# Patient Record
(demographics unavailable — no encounter records)

---

## 2024-10-11 NOTE — HISTORY OF PRESENT ILLNESS
[de-identified] : 35M who presents with multiple ENT problems  Sinus problems started 9/8/24 -> took augmentin -> developed oral thrust -> tried nystatin and fluconazole which the patient believes did not help  Hx of chronic congestion and seasonal allergies. Does not take any allergy medication or use any nasal irrigations or sprays.   Hx of hearing loss as a child in the right ear. Patient was a preemie. He wears a bicros. He has a history of pressure issues in both ears since he was a child and has difficulty popping his ears.

## 2024-10-11 NOTE — ASSESSMENT
[FreeTextEntry1] : - The total time spent on the date of this encounter was 60+ minutes. This includes time spent in both face-to-face and non face-to-face activities (review of tests/documents/independent historians and/or independent interpretation of tests)

## 2024-10-11 NOTE — REASON FOR VISIT
[Initial Consultation] : an initial consultation for [FreeTextEntry2] : thrust, cerumen, hearing loss, sinus problems

## 2024-10-11 NOTE — PHYSICAL EXAM
[TextEntry] : General: AAO, no significant distress Psychiatric: affect pleasant and within normal limits Eyes: relatively symmetric, no obvious nystagmus Skin: no significant lesions on face Nose: no significant lesions; patent. Oral Cavity & Oropharynx: no significant deformity or lesions Neck/Lymphatics: no significant masses or abnormal cervical nodes palpated Respiratory: breathing comfortably; no significant distress Neurologic: cranial nerves II-XII grossly intact; EOMI Facial function: symmetric   Ear examination performed under binocular otologic microscope: Left Ear External: Pinna and periauricular area is normal. Canal: Ear canal skin is not inflamed or edematous. Left cerumen impaction cleaned under microscopy with instrumentation Tympanic Membrane: Intact and in good position. MYRINGOINCUDOPEXY, PRUSSAK RETRACTION   Right Ear External: Pinna and periauricular area is normal. Canal: Ear canal skin is not inflamed or edematous. Right cerumen impaction cleaned under microscopy with instrumentation Tympanic Membrane: Intact and in good position.  Procedure: Left cerumen removal Pre-operative Diagnosis: Left cerumen impaction Post-operative Diagnosis: Left cerumen impaction Anesthesia: None Procedure Details: The patient was placed in the supine position. The left ear canal was determined to be impacted with cerumen. A curette and/or suction was used to remove the cerumen impaction under microscopy. Condition: Stable. Patient tolerated procedure well. Complications: None.   Procedure: Right cerumen removal Pre-operative Diagnosis: Right cerumen impaction Post-operative Diagnosis: Right cerumen impaction Anesthesia: None Procedure Details: The patient was placed in the supine position. The right ear canal was determined to be impacted with cerumen. A curette and/or suction was used to remove the cerumen impaction under microscopy. Condition: Stable. Patient tolerated procedure well. Complications: None.   Nasal Endoscopy:   Pre-operative Diagnosis: nasal obstruction Post-operative Diagnosis: nasal obstruction Anesthesia: Topical oxymetazoline Procedure: Bilateral nasal endoscopy Procedure Details:   The patient was placed in the seated position sitting straight up. The telescope was passed along the left nasal floor to the nasopharynx. It was then passed into the region of the middle meatus, middle turbinate, and the sphenoethmoid region. An identical procedure was performed on the right side.   Findings: Middle meatus: normal bilaterally Sphenoethmoidal recess: normal bilaterally Mucosa: excoriated anteriorly on septum and inferior turbinates Nasal septum: large septal spur on the right touching the inferior turbinate Discharge: none bilaterally Turbinates: inferior turbinate hypertrophy bilaterally Adenoid: normal bilaterally Posterior choanae: normal bilaterally Eustachian tubes: normal bilaterally Mucous stranding: normal bilaterally Lesions: Not present   Comments:   Condition: Stable. Patient tolerated procedure well.

## 2024-10-18 NOTE — HISTORY OF PRESENT ILLNESS
[de-identified] : 35M who presents with multiple ENT problems  Sinus problems started 9/8/24 -> took augmentin -> developed oral thrust -> tried nystatin and fluconazole which the patient believes did not help  Hx of chronic congestion and seasonal allergies. Does not take any allergy medication or use any nasal irrigations or sprays.   Hx of hearing loss as a child in the right ear. Patient was a preemie. He wears a bicros. He has a history of pressure issues in both ears since he was a child and has difficulty popping his ears.   10/18/24: f/u for audiogram. No hearing on the right since childhood. Hears a popping sound in his left ear. When he valsalvas, the hearing improves slightly but then goes back to baseline soon after. Still with sinus issues depiste using NSI and flonase.

## 2024-10-18 NOTE — DATA REVIEWED
[de-identified] : Audiogram personally reviewed and interpreted and my findings are as follows (10/18/24): Right: SRT 85dB; WRS CNT; Type Ad tymp; profound SNHL Left: SRT 40dB; WRS 90%; Type A tymp; normal downsloping to severe SNHL

## 2024-10-18 NOTE — PHYSICAL EXAM
[TextEntry] : General: AAO, no significant distress Psychiatric: affect pleasant and within normal limits Eyes: relatively symmetric, no obvious nystagmus Skin: no significant lesions on face Nose: no significant lesions; patent. Oral Cavity & Oropharynx: no significant deformity or lesions Neck/Lymphatics: no significant masses or abnormal cervical nodes palpated Respiratory: breathing comfortably; no significant distress Neurologic: cranial nerves II-XII grossly intact; EOMI Facial function: symmetric   Ear examination performed under binocular otologic microscope: Left Ear External: Pinna and periauricular area is normal. Canal: Ear canal skin is not inflamed or edematous.  Tympanic Membrane: Intact and in good position. MYRINGOINCUDOPEXY, PRUSSAK RETRACTION   Right Ear External: Pinna and periauricular area is normal. Canal: Ear canal skin is not inflamed or edematous.  Tympanic Membrane: Intact and in good position.

## 2024-10-30 NOTE — HISTORY OF PRESENT ILLNESS
[de-identified] : 35M here for initial evaluation.  He c/o nasal congestion/obstruction with postnasal drip and white mucus down his throat with occasional facial pressure. This persists despite saline and steroid sprays. The congestion affects both sides and can alternate in severity. There are no allergies or hayfever sx. No green mucus or foul nasal odor. He has known GERD and he thinks this also worsens his postnasal drip. Diet could be better.  ROS otherwise unremarkable.

## 2024-10-30 NOTE — ASSESSMENT
[FreeTextEntry1] : 35M here for initial evaluation. He c/o nasal congestion/obstruction with postnasal drip and white mucus down his throat with occasional facial pressure. This persists despite saline and steroid sprays. The congestion affects both sides and can alternate in severity. There are no allergies or hayfever sx. No green mucus or foul nasal odor. He has known GERD and he thinks this also worsens his postnasal drip. His diet could be better for reflux control. On exam, nasal endoscopy shows moderate inferior turbinate hypertrophy and right septal deviation and spurring with scattered mucus. Chronic rhinitis/turbinate hypertrophy and septal deviation, doubtful there is sinusitis. LPR also worsening his postnasal drip. Will get CT sinus for completeness and RTO thereafter. Can offer septoplasty/turbinate reduction if wants additional improvement, pending CT.
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
## 2024-11-27 NOTE — PROCEDURE
[FreeTextEntry3] : +inspiratory collapse marked improvement in breathing w modified agus/agus  Nasal Endoscopy: moderate inferior turbinate hypertrophy -> decongest well severe right septal deviation and spurring middle meati clear

## 2024-11-27 NOTE — ASSESSMENT
[FreeTextEntry1] : 35M here in followup. He c/o nasal congestion/obstruction with postnasal drip and white mucus down his throat with occasional facial pressure. This persists despite saline and steroid sprays. The congestion affects both sides and can alternate in severity. There are no allergies or hayfever sx. No green mucus or foul nasal odor. He has known GERD and he thinks this also worsens his postnasal drip. His diet could be better for reflux control. CT sinus shows complex septal deviation and turbinate hypertrophy, as above. On exam, nasal endoscopy shows moderate inferior turbinate hypertrophy and right septal deviation and spurring; nasal breathing is improved w decongestion but markedly improved with modified agus. There is also inspiratory collapse. Nasal obstruction is multifactorial - I think primary issue is valve collapse. There is also a chronic rhinitis/turbinate hypertrophy and septal deviation, but they are not the main reason to the obstruction. Rhinitis and LPR worsening his postnasal drip. Will send to my facial plastics colleague for further evaluation and management given the valve collapse.

## 2024-11-27 NOTE — HISTORY OF PRESENT ILLNESS
[de-identified] : 35M here in followup.  He c/o nasal congestion/obstruction with postnasal drip and white mucus down his throat with occasional facial pressure. This persists despite saline and steroid sprays. The congestion affects both sides and can alternate in severity. There are no allergies or hayfever sx. No green mucus or foul nasal odor. He has known GERD and he thinks this also worsens his postnasal drip. Diet could be better.  CT Sinus 11/14/24 (I reviewed): -complex right septal spurring and deviation -inferior turbinate hypertrophy -paranasal sinuses clear  ROS otherwise unremarkable.

## 2024-12-11 NOTE — HISTORY OF PRESENT ILLNESS
[de-identified] : Mr Andrew Saul is a pleasant 36 year old male who presents as referral from Dr Lund for nasal obstruction.   Pt reports h/o long-standing nasal obstruction, R>L. Also endorses PND and constant congestion. Symptoms have been present for their whole life. They have tried nasal steroid sprays in the past for 4 consecutive weeks without significant improvement. They have tried nasal saline irrigations. They have not tried nasal dilator strips/nasal cones. They endorse h/o allergies, well-controlled. Endorses frequent h/o sinus infections recently. Denies h/o nasal trauma. Denies h/o nasal surgery. Recent CT imaging revealing septal deviation and clear paranasal sinuses. Pt was evaluated by Dr Lund and nasal endoscopy was completed at that time revealing moderate turbinate hypertrophy and severe septal deviaton. Aesthetically, he has no concerns.   Past medical/surgical history is unremarkable. Nonsmoker. Not on anticoagulation. Here by himself.

## 2024-12-11 NOTE — PHYSICAL EXAM
[TextEntry] : GEN: well nourished, well developed, no acute distress. VOICE: normal tone, quality, and volume, no hoarseness. HEAD: normocephalic, atraumatic, no TMJ pain or jaw clicking FACE: symmetric and motion intact, Juan 1  EYES: EOMI, pupils symmetric, normal conjunctiva, vision grossly intact EARS: bilateral auricles normal, TMs intact & well-aerated, hearing grossly intact EXT NOSE:  thin skin, midline dorsum, overprojected nasal septum, very poor tip support  INT NOSE: Mucosa healthy, severe rightward septum deviation, very narrow internal nasal valve w/ significant improvement on [place] maneuver in  position, bialteral internal valve dynamic collapse, moderate turbinate hypertrophy SKIN: intact, warm, and dry NEURO: alert & oriented x4
No

## 2024-12-11 NOTE — ASSESSMENT
[FreeTextEntry1] : .  Plan: - Patient has significant nasal obstruction refractory to maximal medical therapy and structural anatomic abnormalities that would benefit from nasal airway surgery. This would comprise septoplasty via open approach, nasal valve repair (bilateral  vs LCSG), and bilateral turbinate reduction. - Discussed in detail the benefits, alternatives, and risks of this procedure which include, but are not limited to, infection, bleeding, scarring, change in appearance, septal perforation, continued nasal obstruction, and need for revision surgery. - The patient reports understanding of these risks, the proposed benefits, and alternatives and wishes to proceed.  We will initiate the authorization/scheduling process.  -- Yasmeen Negron MD Facial Plastic & Reconstructive Surgery

## 2025-01-31 NOTE — PHYSICAL EXAM
[No Acute Distress] : no acute distress [Well Nourished] : well nourished [Well Developed] : well developed [Well-Appearing] : well-appearing [Normal Sclera/Conjunctiva] : normal sclera/conjunctiva [PERRL] : pupils equal round and reactive to light [EOMI] : extraocular movements intact [Normal Outer Ear/Nose] : the outer ears and nose were normal in appearance [Normal Oropharynx] : the oropharynx was normal [Thyroid Normal, No Nodules] : the thyroid was normal and there were no nodules present [No Respiratory Distress] : no respiratory distress  [No Accessory Muscle Use] : no accessory muscle use [Clear to Auscultation] : lungs were clear to auscultation bilaterally [Normal Rate] : normal rate  [Regular Rhythm] : with a regular rhythm [Normal S1, S2] : normal S1 and S2 [No Murmur] : no murmur heard [No Edema] : there was no peripheral edema [No CVA Tenderness] : no CVA  tenderness [No Spinal Tenderness] : no spinal tenderness [No Joint Swelling] : no joint swelling [Grossly Normal Strength/Tone] : grossly normal strength/tone [No Rash] : no rash [No Focal Deficits] : no focal deficits [Normal Affect] : the affect was normal [Normal Insight/Judgement] : insight and judgment were intact

## 2025-02-04 NOTE — HISTORY OF PRESENT ILLNESS
[No Adverse Anesthesia Reaction] : no adverse anesthesia reaction in self or family member [No Pertinent Cardiac History] : no history of aortic stenosis, atrial fibrillation, coronary artery disease, recent myocardial infarction, or implantable device/pacemaker [(Patient denies any chest pain, claudication, dyspnea on exertion, orthopnea, palpitations or syncope)] : Patient denies any chest pain, claudication, dyspnea on exertion, orthopnea, palpitations or syncope [Excellent (>10 METs)] : Excellent (>10 METs) [Aortic Stenosis] : no aortic stenosis [Atrial Fibrillation] : no atrial fibrillation [Coronary Artery Disease] : no coronary artery disease [Recent Myocardial Infarction] : no recent myocardial infarction [Implantable Device/Pacemaker] : no implantable device/pacemaker [Asthma] : no asthma [COPD] : no COPD [Sleep Apnea] : no sleep apnea [Smoker] : not a smoker [Family Member] : no family member with adverse anesthesia reaction/sudden death [Self] : no previous adverse anesthesia reaction [Chronic Anticoagulation] : no chronic anticoagulation [Chronic Kidney Disease] : no chronic kidney disease [Diabetes] : no diabetes [FreeTextEntry1] : septoplasty, nasal valve repair, bilateral turbinate reduction [FreeTextEntry2] : 2/12/2025 [FreeTextEntry3] : Dr. Yasmeen Negron  [FreeTextEntry4] : 36 M presents for pre-op visit. Will be having surgery 2/21. Pt feels well, also here for f/u PREP and STD testing.

## 2025-02-26 NOTE — REASON FOR VISIT
[de-identified] : open nasal airway surgery [de-identified] : 2/21/25 [de-identified] : Pt is doing well since surgery. Pain is well-controlled at this point on just Tylenol/Advil. He has been doing the mupirocin ointment and nasal saline sprays as prescribed.   Exam: Dorsum moderate swelling as expected Transcolumellar incision well-healing Septum intact & midline, nasal airway widely patent Nasal tip well-supported   . Plan: - Mupirocin & nasal saline sprays as prescribed. - Nasal precautions reviewed. - RTC 1 month   -- Yasmeen Negron MD Facial Plastic & Reconstructive Surgery

## 2025-03-19 NOTE — PLAN
[TextEntry] : - Trial of 5 days of Keflex and if not improved consider I&D. - Warm compresses - RTC in 1 week

## 2025-03-19 NOTE — REASON FOR VISIT
[de-identified] : MICHAEL NUGENT is status post open nasal airway surgery and he is here for a post-op visit.   Surgery Date: 2/21/25   Pt is doing well since surgery. Pain is well-controlled at this point. He still has some numbness and swelling of the nose as expected. He has been doing the mupirocin ointment and nasal saline sprays as prescribed.  Exam: Dorsum moderate swelling as expected Transcolumellar incision well-healing Septum intact & midline, nasal airway widely patent Nasal tip well-supported  . Plan: - Mupirocin & nasal saline sprays as prescribed. - Nasal precautions reviewed. - RTC 2 months  -- Yasmeen Negron MD Facial Plastic & Reconstructive Surgery.

## 2025-03-19 NOTE — HISTORY OF PRESENT ILLNESS
[FreeTextEntry1] : 36M presents concerned for pilonidal cyst. He underwent a rhinoplasty 2 weeks ago and has been laying in bed on his back for prolonged periods of time. Over the past week he developed a painful bump on tailbone. Denies drainage to area. No history of abscesses or issues with ingrown hairs. Having daily soft BMs, no recent constipation or straining.   Referred by: Samantha Referral Reason: "cyst on buttock"  PMH: hard of hearing PSH: rhinoplasty FH: denies CRC/ IBD Medications: descovy Allergies: sulfa drugs Social history: social ETOH Last Colonoscopy: denies

## 2025-03-19 NOTE — ASSESSMENT
[FreeTextEntry1] : 36M with cellulitis of the right medial buttock, less likely pilonidal in origine due to lack of midline pit, however may still be cystic in nature.

## 2025-03-19 NOTE — PHYSICAL EXAM
[Abdomen Tenderness] : ~T No ~M abdominal tenderness [JVD] : no jugular venous distention  [Normal Breath Sounds] : Normal breath sounds [No Rash or Lesion] : No rash or lesion [Alert] : alert [Calm] : calm [de-identified] : Well appearing male in NAD [de-identified] : MMM [de-identified] : ROM WNL [FreeTextEntry1] : The pt was examined in the prone axel-knife position with a medical assistant present for the entirety of the examination. Visual examination of the anal verge with effacement of the buttocks revealed no masses, ulcerations, fissures or skin rashes. At the superior medial right intergluteal cleft there was a 2x2cm area of erythema off midline with mild induration and questionable fluctuance. No midline pit appreciated. Digital rectal exam revealed no palpable mass or blood with sphincter tone within normal limits. A lubricated anoscope was then inserted revealing healthy appearing distal rectal mucosa and anoderm with three appropriately sized, noninflamed internal hemorrhoids.  The patient tolerated the exam well.

## 2025-04-09 NOTE — PHYSICAL EXAM
[Abdomen Tenderness] : ~T No ~M abdominal tenderness [JVD] : no jugular venous distention  [Normal Breath Sounds] : Normal breath sounds [No Rash or Lesion] : No rash or lesion [Alert] : alert [Calm] : calm [de-identified] : Well appearing male in NAD [de-identified] : MMM [de-identified] : ROM WNL [FreeTextEntry1] : The pt was examined in the prone axel-knife position with a medical assistant present for the entirety of the examination. Visual examination of the anal verge with effacement of the buttocks revealed no masses, ulcerations, fissures or skin rashes. At the superior medial right intergluteal cleft there was a 1x1cm area of mild induration, no fluctuance and no skin changes. No midline pit appreciated.   The patient tolerated the exam well.

## 2025-04-09 NOTE — HISTORY OF PRESENT ILLNESS
[FreeTextEntry1] : 36M with cellulitis of the right medial buttock, less likely pilonidal in origin due to lack of midline pit, however may still be cystic in nature. He was seen 3 weeks prior and recommended a trial of Keflex. Today he presents for follow up. Finished his course of antibiotics. He reports symptom improvement but still present. Denies any drainage or pain. Bowel movement has been normal.   Referred by: Samantha Referral Reason: "cyst on buttock"  PMH: hard of hearing PSH: rhinoplasty FH: denies CRC/ IBD Medications: descovy Allergies: sulfa drugs Social history: social ETOH Last Colonoscopy: denies

## 2025-04-09 NOTE — ASSESSMENT
[FreeTextEntry1] : 36M with cellulitis of the right medial buttock, less likely pilonidal in origin due to lack of midline pit, however may still be cystic in nature. Overall improved with limited abx course.

## 2025-04-09 NOTE — PLAN
[TextEntry] : - Continue to observe for now and RTC in 1 month for interval exam. - If palpable induration or cyst remains can consider MRI at that point

## 2025-05-09 NOTE — HISTORY OF PRESENT ILLNESS
[FreeTextEntry1] : followup  [de-identified] : 36 M presents for followup hyperhidrosis. Pt was seen by derm, tried glycopyrrolate but caused dry mouth. He would like to see endo to rule out hormonal cause of symptoms. Did have normal TSH last year.

## 2025-05-09 NOTE — ASSESSMENT
[FreeTextEntry1] : #Hyperhidrosis -saw derm, was prescribed glycopyrrolate which he had side effects to -discussed potentially trying botox with derm  -pt interested in seeing endo to r/o other cause of increased sweating and body warmth, TSH WNL last year -endo referral provided   #on HIV prep -continue descovy -f/u STD testing

## 2025-05-09 NOTE — HISTORY OF PRESENT ILLNESS
[FreeTextEntry1] : followup  [de-identified] : 36 M presents for followup hyperhidrosis. Pt was seen by derm, tried glycopyrrolate but caused dry mouth. He would like to see endo to rule out hormonal cause of symptoms. Did have normal TSH last year.

## 2025-05-14 NOTE — PHYSICAL EXAM
[Abdomen Tenderness] : ~T No ~M abdominal tenderness [JVD] : no jugular venous distention  [Normal Breath Sounds] : Normal breath sounds [No Rash or Lesion] : No rash or lesion [Alert] : alert [Calm] : calm [de-identified] : Well appearing male in NAD [de-identified] : MMM [de-identified] : ROM WNL [FreeTextEntry1] : The pt was examined in the prone axel-knife position with a medical assistant present for the entirety of the examination. Visual examination of the anal verge with effacement of the buttocks revealed no masses, ulcerations, fissures or skin rashes. At the inferior medial right intergluteal cleft there was a 1x1cm area of a palpable subdermal cyst or nodule, no fluctuance and no skin changes. No midline pit appreciated.   The patient tolerated the exam well.

## 2025-05-14 NOTE — REASON FOR VISIT
[de-identified] : open nasal airway surgery [de-identified] : 2/21/25 [de-identified] : Pt is doing well since surgery. Pain is well-controlled at this point. He notes some asymmetric swelling of the nasal tip. Reports that nasal breathing is much better.    Exam: Dorsum moderate swelling as expected - improving Transcolumellar incision well-healing Septum intact & midline, nasal airway widely patent Nasal tip well-supported   . Plan: - Nasal saline sprays as prescribed. - Nasal precautions reviewed. - RTC PRN   -- Yasmeen Negron MD Facial Plastic & Reconstructive Surgery

## 2025-05-14 NOTE — PLAN
[TextEntry] : - Recommend US to better categorize the lesion prior to discussion of excisional biopsy.

## 2025-05-14 NOTE — HISTORY OF PRESENT ILLNESS
[FreeTextEntry1] : 36M with cellulitis of the right medial buttock, less likely pilonidal in origin due to lack of midline pit, however may still be cystic in nature which improved on a limited course of antibiotic. Advised continue observation. Patient presents today for interval follow up and is without complaints. He has no pain or drainage from area. He reports still feeling the cystic area but denies irritation.   Initially referred by: Samantha  PMH: hard of hearing PSH: rhinoplasty FH: denies CRC/ IBD Medications: descovy Allergies: sulfa drugs Social history: social ETOH Last Colonoscopy: denies

## 2025-05-14 NOTE — PHYSICAL EXAM
[Abdomen Tenderness] : ~T No ~M abdominal tenderness [JVD] : no jugular venous distention  [Normal Breath Sounds] : Normal breath sounds [No Rash or Lesion] : No rash or lesion [Alert] : alert [Calm] : calm [de-identified] : Well appearing male in NAD [de-identified] : MMM [de-identified] : ROM WNL [FreeTextEntry1] : The pt was examined in the prone axel-knife position with a medical assistant present for the entirety of the examination. Visual examination of the anal verge with effacement of the buttocks revealed no masses, ulcerations, fissures or skin rashes. At the inferior medial right intergluteal cleft there was a 1x1cm area of a palpable subdermal cyst or nodule, no fluctuance and no skin changes. No midline pit appreciated.   The patient tolerated the exam well.

## 2025-06-07 NOTE — DATA REVIEWED
[de-identified] : Audiogram personally reviewed and interpreted and my findings are as follows (10/18/24): Right: SRT 85dB; WRS CNT; Type Ad tymp; profound SNHL Left: SRT 40dB; WRS 90%; Type A tymp; normal downsloping to severe SNHL

## 2025-06-07 NOTE — ASSESSMENT
[FreeTextEntry1] : Bilateral SNHL - 1 chronic illness - audiogram (10/18/24) reviewed with patient  - R profound SNHL since childhood - L normal down to severe SNHL - f/u in October 2025 for repeat audiogram  Bilateral ETD - 1 chronic illness - L ear wtih prussak retraction and myringoincudopexy - will continue to monitor for possible IS joint degeneration in the future - watch for air-bone gap development in the left ear  Post-nasal drip / Chronic rhinitis - 1 chronic illness - s/p open septoplasty with Dr. Negron 02/2025 with excellent result - ipratropium bromide - 1 spray to each nostril BID - f/u in October 2025  Tonsil stones - recommend water pick to wash tonsils  Tip of nose pain - continue to monitor, likely continued sensitivity from recent open septoplasty

## 2025-06-07 NOTE — HISTORY OF PRESENT ILLNESS
[de-identified] : 35M who presents with multiple ENT problems  Sinus problems started 9/8/24 -> took augmentin -> developed oral thrust -> tried nystatin and fluconazole which the patient believes did not help  Hx of chronic congestion and seasonal allergies. Does not take any allergy medication or use any nasal irrigations or sprays.   Hx of hearing loss as a child in the right ear. Patient was a preemie. He wears a bicros. He has a history of pressure issues in both ears since he was a child and has difficulty popping his ears.   10/18/24: f/u for audiogram. No hearing on the right since childhood. Hears a popping sound in his left ear. When he valsalvas, the hearing improves slightly but then goes back to baseline soon after. Still with sinus issues depiste using NSI and flonase.   6/6/25: Patient is s/p open septoplasty with Dr. Negron. He is breathing much better but has been suffering from post-nasal drip, tonsil stones, and nasal pain.

## 2025-06-25 NOTE — HISTORY OF PRESENT ILLNESS
[de-identified] : 36 year old male initially presenting with nasal obstruction s/p open nasal airway surgery carried out on 2/21/25. He is doing well from breathing standpoint. He presents because he feels he has mucus in the back of the throat. Worse in the mornings, especially after coffee. He also endorses wet cough and foul taste in mouth. He feels he has some PND. He does have h/o tonsil stones that started in the last year that he clears with salt-water gargles. He also does endorse h/o gastritis. He is not on reflux medication. Of note has hearing loss, sees Dr Montana.

## 2025-06-25 NOTE — ASSESSMENT
[FreeTextEntry1] : .  Plan: - Suspect symptoms are related to reflux. Recommended omeprazole trial - Recommended diet changes to limit reflux triggers - RTC 1-2 months for reevaluation  -- Yasmeen Negron MD Facial Plastic & Reconstructive Surgery

## 2025-06-25 NOTE — PROCEDURE
[FreeTextEntry3] : Flexible Laryngoscopy: Pre-operative diagnosis: cough, mucus Indication: Mirror exam insufficient to diagnose pathology Details: After decongestant and lidocaine was sprayed in the bilateral nasal cavities, a flexible endoscope was inserted into the right nares. The nasal cavity, middle meatus, and nasopharynx were visualized. The endoscope was then flexed inferiorly to visualize the oropharynx, hypopharynx, and larynx. The endoscope was then inserted into the left nares and an identical procedure was performed. The patient tolerated the procedure well. Findings: no masses or lesions, vocal cords symmetric and mobile bilaterally, moderate-severe postcricoid edema

## 2025-06-25 NOTE — PHYSICAL EXAM
[TextEntry] : GEN: well nourished, well developed, no acute distress. HEAD: normocephalic, atraumatic FACE: symmetric and motion intact, Juan 1  EXT NOSE:  no external deformity, well-healing transcolumellar incision, tip well-supported INT NOSE: Mucosa healthy, midline septum, widely patent nasal airway  ORAL CAVITY: normal dentition with baseline occlusion, mucus membranes healthy, 2-3+ tonsils without tonsil stones today, no evidence of thrush SKIN: intact, warm, and dry NEURO: alert & oriented x4

## 2025-07-23 NOTE — ASSESSMENT
[FreeTextEntry1] : .  Plan: - Reassurance on appearance of tongue - Continue reflux management - RTC PRN  -- Yasmeen Negron MD Facial Plastic & Reconstructive Surgery

## 2025-07-23 NOTE — HISTORY OF PRESENT ILLNESS
[de-identified] : 36 year old male initially presenting with nasal obstruction s/p open nasal airway surgery carried out on 2/21/25. He is doing well from breathing standpoint. He reports no significant improvement with the. mucus sensation despite PPI. He has been working on improving his diet. He does have occasional tonsil stones. Doing great from nasal breathing standpoint. He is also slightly bothered by the white coating on his tongue.

## 2025-07-23 NOTE — PHYSICAL EXAM
[TextEntry] : GEN: well nourished, well developed, no acute distress. FACE: symmetric and motion intact, Juan 1 EXT NOSE: no external deformity, well-healing transcolumellar incision, tip well-supported INT NOSE: Mucosa healthy, midline septum, widely patent nasal airway ORAL CAVITY: normal dentition with baseline occlusion, mucus membranes healthy, 2-3+ tonsils without tonsil stones today, no evidence of thrush, tongue normal SKIN: intact, warm, and dry NEURO: alert & oriented x4

## 2025-07-23 NOTE — PROCEDURE
[FreeTextEntry3] : Flexible Laryngoscopy: Pre-operative diagnosis: mucus Indication: Mirror exam insufficient to diagnose pathology Details: After decongestant and lidocaine was sprayed in the bilateral nasal cavities, a flexible endoscope was inserted into the right nares. The nasal cavity, middle meatus, and nasopharynx were visualized. The endoscope was then flexed inferiorly to visualize the oropharynx, hypopharynx, and larynx. The endoscope was then inserted into the left nares and an identical procedure was performed. The patient tolerated the procedure well. Findings: no masses or lesions, vocal cords symmetric and mobile bilaterally, mild-moderate postcricoid edema